# Patient Record
(demographics unavailable — no encounter records)

---

## 2024-10-18 NOTE — DISCUSSION/SUMMARY
[FreeTextEntry1] : 10 year girl found to be rapid strep positive.  Complete 10 days of antibiotics.  Symptomatic treatment - increase fluids Use antipyretics as needed. After being on antibiotics for atleast 24 hours patient less likely to spread infection. Hand washing and infection control discussed. Recheck if symptoms persist/worsen or febrile

## 2024-10-18 NOTE — HISTORY OF PRESENT ILLNESS
[de-identified] : pt complains of sore throat, slight headache, and cough, afebrile  [FreeTextEntry6] : No fever Sore throat x 2 days No Cough, runny nose, nasal congestion since last visit for f/u PNA No chest pain or SOB No vomiting, no diarrhea appetite decreased normal UOP

## 2024-10-18 NOTE — PHYSICAL EXAM
[Erythematous Oropharynx] : erythematous oropharynx [Enlarged Tonsils] : enlarged tonsils [Palate petechiae] : palate petechiae [NL] : warm, clear [de-identified] : b/l SM LAD

## 2024-10-21 NOTE — DISCUSSION/SUMMARY
[Normal Growth] : growth [Normal Development] : development  [No Elimination Concerns] : elimination [Continue Regimen] : feeding [No Skin Concerns] : skin [Normal Sleep Pattern] : sleep [None] : no medical problems [Anticipatory Guidance Given] : Anticipatory guidance addressed as per the history of present illness section [School] : school [Development and Mental Health] : development and mental health [Nutrition and Physical Activity] : nutrition and physical activity [Oral Health] : oral health [Safety] : safety [No Vaccines] : no vaccines needed [No Medications] : ~He/She~ is not on any medications [Patient] : patient [Parent/Guardian] : Parent/Guardian [Full Activity without restrictions including Physical Education & Athletics] : Full Activity without restrictions including Physical Education & Athletics [FreeTextEntry1] : Continue balanced diet with all food groups. Brush teeth twice a day with toothbrush. Recommend visit to dentist. Help child to maintain consistent daily routines and sleep schedule. School discussed. Ensure home is safe. Teach child about personal safety. Use consistent, positive discipline. Limit screen time to no more than 2 hours per day. Encourage physical activity. Child needs to ride in a belt-positioning booster seat until  4 feet 9 inches has been reached and are between 8 and 12 years of age.  Cardiac questionnaire reviewed, + PGM and PU with a. fib 5-2-1-0 questionnaire reviewed, concerns discussed Discussed in the preferred language of English Return 1 year for routine well child check. complete amoxicillin as prescribed - recheck if symptoms worsen or febrile will come back for flu vaccine when feeling better

## 2024-10-21 NOTE — HISTORY OF PRESENT ILLNESS
[Normal] : Normal [Brushing teeth twice/d] : brushing teeth twice per day [Yes] : Patient goes to dentist yearly [Toothpaste] : Primary Fluoride Source: Toothpaste [Premenarche] : premenarche [Grade ___] : Grade [unfilled] [Adequate performance] : adequate performance [No] : No cigarette smoke exposure [Up to date] : Up to date [Mother] : mother [FreeTextEntry7] : 10 year well child [de-identified] : slightly picky, not many veggies but eating a little better [FreeTextEntry9] : gymnastics [FreeTextEntry1] : on amox for strep = feeling better.  No fever currently.  Sore throat is better.    Had covid in August - mild symptoms.  No CP/SOB with activity since.    Had PNA right before strep

## 2024-10-21 NOTE — PHYSICAL EXAM
[Alert] : alert [No Acute Distress] : no acute distress [Normocephalic] : normocephalic [Conjunctivae with no discharge] : conjunctivae with no discharge [PERRL] : PERRL [EOMI Bilateral] : EOMI bilateral [Auricles Well Formed] : auricles well formed [Clear Tympanic membranes with present light reflex and bony landmarks] : clear tympanic membranes with present light reflex and bony landmarks [No Discharge] : no discharge [Nares Patent] : nares patent [Pink Nasal Mucosa] : pink nasal mucosa [Palate Intact] : palate intact [Nonerythematous Oropharynx] : nonerythematous oropharynx [Supple, full passive range of motion] : supple, full passive range of motion [No Palpable Masses] : no palpable masses [Symmetric Chest Rise] : symmetric chest rise [Clear to Auscultation Bilaterally] : clear to auscultation bilaterally [Regular Rate and Rhythm] : regular rate and rhythm [Normal S1, S2 present] : normal S1, S2 present [No Murmurs] : no murmurs [+2 Femoral Pulses] : +2 femoral pulses [Soft] : soft [NonTender] : non tender [Non Distended] : non distended [Normoactive Bowel Sounds] : normoactive bowel sounds [No Hepatomegaly] : no hepatomegaly [No Splenomegaly] : no splenomegaly [Ata: ____] : Ata [unfilled] [Ata: _____] : Ata [unfilled] [No Gait Asymmetry] : no gait asymmetry [No pain or deformities with palpation of bone, muscles, joints] : no pain or deformities with palpation of bone, muscles, joints [Normal Muscle Tone] : normal muscle tone [Straight] : straight [+2 Patella DTR] : +2 patella DTR [Cranial Nerves Grossly Intact] : cranial nerves grossly intact [No Rash or Lesions] : no rash or lesions [FreeTextEntry6] : normal external genitalia [de-identified] : b/l SM LAD

## 2024-11-07 NOTE — PLAN
[TextEntry] : Rapid strep test was not done do to recent illness of strep Patient to take tylenol or motrin as directed for pain or discomfort. Increase fluids, and rest. Follow up if symptoms worsen or persist. If culture positive to start cefadroxil 30mg/kg/day divided BID 10 days  consider labwork if persistently recurrently sick

## 2024-11-07 NOTE — HISTORY OF PRESENT ILLNESS
[de-identified] : pt complains of headache, and sore throat, afebrile  [FreeTextEntry6] : sore throat started up today was fine yesterday recent strep infection no fever tmax 99 no vomiting/diarrhea mom concerned she is getting sick often but no red flags as discussed they did not change toothbrush after last strep infection

## 2025-01-11 NOTE — PLAN
[TextEntry] : 10 year girl found to be rapid strep positive. Complete 10 days of antibiotics. Use antipyretics as needed. Return for follow up in 2 weeks is any symptoms remain. Change toothbrush after 2 days on antibiotic, change towels, bed linen, clothes, and clean surfaces. After being on antibiotics for at least 24 hours patient less likely to spread infection.

## 2025-01-11 NOTE — HISTORY OF PRESENT ILLNESS
[de-identified] : Sore throat for 2 days, headache fevr of 102.2 this AM. 11:30 AM last motrin dose.   [FreeTextEntry6] : sore throat and fever tmax 102 today started yesterday

## 2025-05-09 NOTE — PLAN
[TextEntry] : Reduce physical activity today Use analgesics prn Pay close observation for new or worsening symptoms Instructed to return to office if condition worsens or new symptoms arise if still headache in 24 hours will need follow up  Go to ER or UC if condition worsens or unable to to get to the office or after office hours

## 2025-05-09 NOTE — PHYSICAL EXAM
[TextEntry] : General: awake, alert, cooperative, appropriate, no acute distress Head: no signs injury, no tenderness to touch Eyes: EOMI, PERRL, no discharge, no conjunctival or scleral erythema  Ears: tympanic membranes clear bilaterally without erythema or purulent effusion, normal light reflex Nose: no rhinnorhea Mouth: mucosa moist and pink, mild erythema to the oropharynx, no vesicles, exudates, lesions or soft palate petechiae Neck: supple, good range of motion Lungs: clear to auscultation bilaterally  Cardiac: normal S1 S2, regular rate and rhythm Abdomen: soft, non tender, non distended Lymphatics: no cervical lymphadenopathy, no pre or post auricular lymphadenopathy, no occipital lymphadenopathy Neuro: good tone, strength B/L upper and lower extremities WNL and equal, CN II-XII grossly intact, gait normal Skin: no rash

## 2025-05-09 NOTE — HISTORY OF PRESENT ILLNESS
[de-identified] : Patient hit left side of head on door at school today. Initially c/o dizziness. Now c/o headache. No LOC.  [FreeTextEntry6] :  Patient sustained a head injury at approximately 11 AM today when caught between closing doors, hitting the left side and back of her head. Initially complained of dizziness and headache. Headache initially rated 4-5/10, now improved to 1/10. No loss of consciousness or vomiting reported. Patient also complained of sore throat yesterday, which seems to have improved today. Grandmother notes patient is very tired. No fever, No cough, No wheezing, No ear pain, No sore throat, No nasal congestion Normal appetite, No vomiting, No diarrhea Headache, no Nausea, no Vomiting, no Dizziness, no Balance Issues, no Weakness, no Irritability Not Feeling foggy, no Concentration problems, no Numbness, no Tingling No Vision changes, no Light Sensitivity, no Noise Sensitivity No Memory problems, not Feeling slow, no Insomnia, no Drowsiness

## 2025-07-19 NOTE — DISCUSSION/SUMMARY
[FreeTextEntry1] : Zithromax as Rx Follow up in 1 week, sooner if worse Supportive care Albuterol HFA 2 puffs Q 4-6 hours PRN

## 2025-07-19 NOTE — PHYSICAL EXAM
[NL] : no abnormal lymph nodes palpated [FreeTextEntry7] : rales RLL, course rhonchi anterior chest with expiration

## 2025-07-19 NOTE — HISTORY OF PRESENT ILLNESS
[de-identified] : persistent cough and sore throat  [FreeTextEntry6] : Mom noted SPO2 decreased at 96-97% last night Did inhaler with relief cough x 1 week Mom with recent pneumonia afebrile Pt was c/o back pain R lower side when her cough started